# Patient Record
Sex: MALE | Race: WHITE | Employment: OTHER | URBAN - METROPOLITAN AREA
[De-identification: names, ages, dates, MRNs, and addresses within clinical notes are randomized per-mention and may not be internally consistent; named-entity substitution may affect disease eponyms.]

---

## 2017-05-23 NOTE — PERIOP NOTES
H&P received from Dr. Oneida rose is outdated and the EKG is not signed or interpreted by a physician. Left a VM johnie Canseco at Dr. Oneida Carreno office informing her of this. Stated that Dr. Johnathon Tsang would have to do a full H&P the morning of surgery unless a current H&P was obtained. Also informed her that if an EKG was needed by anesthesia, that another EKG would be performed on the day of surgery unless an EKG that was signed and interpreted was able to be obtained.   DOS: 5/25/2017

## 2017-05-24 ENCOUNTER — ANESTHESIA EVENT (OUTPATIENT)
Dept: SURGERY | Age: 52
End: 2017-05-24
Payer: COMMERCIAL

## 2017-05-24 PROBLEM — M93.272: Status: ACTIVE | Noted: 2017-05-24

## 2017-05-25 ENCOUNTER — APPOINTMENT (OUTPATIENT)
Dept: GENERAL RADIOLOGY | Age: 52
End: 2017-05-25
Attending: ORTHOPAEDIC SURGERY
Payer: COMMERCIAL

## 2017-05-25 ENCOUNTER — HOSPITAL ENCOUNTER (OUTPATIENT)
Age: 52
Discharge: HOME | End: 2017-05-26
Attending: ORTHOPAEDIC SURGERY | Admitting: ORTHOPAEDIC SURGERY
Payer: COMMERCIAL

## 2017-05-25 ENCOUNTER — ANESTHESIA (OUTPATIENT)
Dept: SURGERY | Age: 52
End: 2017-05-25
Payer: COMMERCIAL

## 2017-05-25 PROCEDURE — 77030021302 HC BUR RND FLUT BRSM -B: Performed by: ORTHOPAEDIC SURGERY

## 2017-05-25 PROCEDURE — 77030020268 HC MISC GENERAL SUPPLY: Performed by: ORTHOPAEDIC SURGERY

## 2017-05-25 PROCEDURE — 77030002916 HC SUT ETHLN J&J -A: Performed by: ORTHOPAEDIC SURGERY

## 2017-05-25 PROCEDURE — 77030003862 HC BIT DRL SYNT -B: Performed by: ORTHOPAEDIC SURGERY

## 2017-05-25 PROCEDURE — C1713 ANCHOR/SCREW BN/BN,TIS/BN: HCPCS | Performed by: ORTHOPAEDIC SURGERY

## 2017-05-25 PROCEDURE — 77030020782 HC GWN BAIR PAWS FLX 3M -B

## 2017-05-25 PROCEDURE — 99218 HC RM OBSERVATION: CPT

## 2017-05-25 PROCEDURE — 77030012890

## 2017-05-25 PROCEDURE — 77030018836 HC SOL IRR NACL ICUM -A: Performed by: ORTHOPAEDIC SURGERY

## 2017-05-25 PROCEDURE — 74011000250 HC RX REV CODE- 250: Performed by: ORTHOPAEDIC SURGERY

## 2017-05-25 PROCEDURE — 77030027138 HC INCENT SPIROMETER -A

## 2017-05-25 PROCEDURE — 77030028907 HC WRP KNEE WO BGS SOLM -B

## 2017-05-25 PROCEDURE — 76060000065 HC AMB SURG ANES 2.5 TO 3 HR: Performed by: ORTHOPAEDIC SURGERY

## 2017-05-25 PROCEDURE — 76030000022 HC AMB SURG 2.5 TO 3 HR INTENSV-TIER 1: Performed by: ORTHOPAEDIC SURGERY

## 2017-05-25 PROCEDURE — 77030014646 HC SEAL FBRN TISSL 2ML W/APPL BAXT -C: Performed by: ORTHOPAEDIC SURGERY

## 2017-05-25 PROCEDURE — 74011250637 HC RX REV CODE- 250/637: Performed by: PHYSICIAN ASSISTANT

## 2017-05-25 PROCEDURE — 74011000250 HC RX REV CODE- 250

## 2017-05-25 PROCEDURE — 77030032490 HC SLV COMPR SCD KNE COVD -B

## 2017-05-25 PROCEDURE — 77030026202 HC GRFT BN SUB DBX FD LIFV -C: Performed by: ORTHOPAEDIC SURGERY

## 2017-05-25 PROCEDURE — 77030031139 HC SUT VCRL2 J&J -A: Performed by: ORTHOPAEDIC SURGERY

## 2017-05-25 PROCEDURE — 77030028224 HC PDNG CST BSNM -A: Performed by: ORTHOPAEDIC SURGERY

## 2017-05-25 PROCEDURE — 77030011640 HC PAD GRND REM COVD -A: Performed by: ORTHOPAEDIC SURGERY

## 2017-05-25 PROCEDURE — 64445 NJX AA&/STRD SCIATIC NRV IMG: CPT

## 2017-05-25 PROCEDURE — 74011250636 HC RX REV CODE- 250/636: Performed by: PHYSICIAN ASSISTANT

## 2017-05-25 PROCEDURE — 76210000035 HC AMBSU PH I REC 1 TO 1.5 HR: Performed by: ORTHOPAEDIC SURGERY

## 2017-05-25 PROCEDURE — 77030020143 HC AIRWY LARYN INTUB CGAS -A: Performed by: ANESTHESIOLOGY

## 2017-05-25 PROCEDURE — 77030000032 HC CUF TRNQT ZIMM -B: Performed by: ORTHOPAEDIC SURGERY

## 2017-05-25 PROCEDURE — 77030003601 HC NDL NRV BLK BBMI -A

## 2017-05-25 PROCEDURE — 74011250636 HC RX REV CODE- 250/636

## 2017-05-25 PROCEDURE — 74011250636 HC RX REV CODE- 250/636: Performed by: ORTHOPAEDIC SURGERY

## 2017-05-25 PROCEDURE — 77030003861 HC BIT DRL STRY -C: Performed by: ORTHOPAEDIC SURGERY

## 2017-05-25 PROCEDURE — 74011250636 HC RX REV CODE- 250/636: Performed by: ANESTHESIOLOGY

## 2017-05-25 PROCEDURE — C1769 GUIDE WIRE: HCPCS | Performed by: ORTHOPAEDIC SURGERY

## 2017-05-25 PROCEDURE — 77030019908 HC STETH ESOPH SIMS -A: Performed by: ANESTHESIOLOGY

## 2017-05-25 DEVICE — LOCKING SCREW, T10
Type: IMPLANTABLE DEVICE | Site: ANKLE | Status: FUNCTIONAL
Brand: VARIAX

## 2017-05-25 DEVICE — BONE SCREW, T10
Type: IMPLANTABLE DEVICE | Site: ANKLE | Status: FUNCTIONAL
Brand: VARIAX

## 2017-05-25 DEVICE — OPTIUM DBM PUTTY
Type: IMPLANTABLE DEVICE | Site: ANKLE | Status: FUNCTIONAL
Brand: OPTIUM®

## 2017-05-25 DEVICE — STRAIGHT PLATE
Type: IMPLANTABLE DEVICE | Site: ANKLE | Status: FUNCTIONAL
Brand: VARIAX

## 2017-05-25 RX ORDER — LIDOCAINE HYDROCHLORIDE 10 MG/ML
0.1 INJECTION, SOLUTION EPIDURAL; INFILTRATION; INTRACAUDAL; PERINEURAL AS NEEDED
Status: DISCONTINUED | OUTPATIENT
Start: 2017-05-25 | End: 2017-05-25 | Stop reason: HOSPADM

## 2017-05-25 RX ORDER — SODIUM CHLORIDE 0.9 % (FLUSH) 0.9 %
5-10 SYRINGE (ML) INJECTION EVERY 8 HOURS
Status: DISCONTINUED | OUTPATIENT
Start: 2017-05-25 | End: 2017-05-25 | Stop reason: HOSPADM

## 2017-05-25 RX ORDER — HYDROMORPHONE HYDROCHLORIDE 1 MG/ML
.25-1 INJECTION, SOLUTION INTRAMUSCULAR; INTRAVENOUS; SUBCUTANEOUS
Status: DISCONTINUED | OUTPATIENT
Start: 2017-05-25 | End: 2017-05-25 | Stop reason: HOSPADM

## 2017-05-25 RX ORDER — ONDANSETRON 2 MG/ML
4 INJECTION INTRAMUSCULAR; INTRAVENOUS AS NEEDED
Status: DISCONTINUED | OUTPATIENT
Start: 2017-05-25 | End: 2017-05-25 | Stop reason: HOSPADM

## 2017-05-25 RX ORDER — DIPHENHYDRAMINE HYDROCHLORIDE 50 MG/ML
12.5 INJECTION, SOLUTION INTRAMUSCULAR; INTRAVENOUS
Status: DISCONTINUED | OUTPATIENT
Start: 2017-05-25 | End: 2017-05-26 | Stop reason: HOSPADM

## 2017-05-25 RX ORDER — OXYCODONE HYDROCHLORIDE 5 MG/1
10 TABLET ORAL
Status: DISCONTINUED | OUTPATIENT
Start: 2017-05-25 | End: 2017-05-26 | Stop reason: HOSPADM

## 2017-05-25 RX ORDER — MIDAZOLAM HYDROCHLORIDE 1 MG/ML
INJECTION, SOLUTION INTRAMUSCULAR; INTRAVENOUS AS NEEDED
Status: DISCONTINUED | OUTPATIENT
Start: 2017-05-25 | End: 2017-05-25 | Stop reason: HOSPADM

## 2017-05-25 RX ORDER — SODIUM CHLORIDE, SODIUM LACTATE, POTASSIUM CHLORIDE, CALCIUM CHLORIDE 600; 310; 30; 20 MG/100ML; MG/100ML; MG/100ML; MG/100ML
100 INJECTION, SOLUTION INTRAVENOUS CONTINUOUS
Status: DISCONTINUED | OUTPATIENT
Start: 2017-05-25 | End: 2017-05-25 | Stop reason: HOSPADM

## 2017-05-25 RX ORDER — SODIUM CHLORIDE 0.9 % (FLUSH) 0.9 %
5-10 SYRINGE (ML) INJECTION AS NEEDED
Status: DISCONTINUED | OUTPATIENT
Start: 2017-05-25 | End: 2017-05-26 | Stop reason: HOSPADM

## 2017-05-25 RX ORDER — NALOXONE HYDROCHLORIDE 0.4 MG/ML
0.4 INJECTION, SOLUTION INTRAMUSCULAR; INTRAVENOUS; SUBCUTANEOUS AS NEEDED
Status: DISCONTINUED | OUTPATIENT
Start: 2017-05-25 | End: 2017-05-26 | Stop reason: HOSPADM

## 2017-05-25 RX ORDER — OXYCODONE HYDROCHLORIDE 5 MG/1
5 TABLET ORAL
Status: DISCONTINUED | OUTPATIENT
Start: 2017-05-25 | End: 2017-05-26 | Stop reason: HOSPADM

## 2017-05-25 RX ORDER — CEFAZOLIN SODIUM 1 G/3ML
INJECTION, POWDER, FOR SOLUTION INTRAMUSCULAR; INTRAVENOUS AS NEEDED
Status: DISCONTINUED | OUTPATIENT
Start: 2017-05-25 | End: 2017-05-25 | Stop reason: HOSPADM

## 2017-05-25 RX ORDER — SODIUM CHLORIDE 0.9 % (FLUSH) 0.9 %
5-10 SYRINGE (ML) INJECTION AS NEEDED
Status: DISCONTINUED | OUTPATIENT
Start: 2017-05-25 | End: 2017-05-25 | Stop reason: HOSPADM

## 2017-05-25 RX ORDER — CEFAZOLIN SODIUM IN 0.9 % NACL 2 G/50 ML
2 INTRAVENOUS SOLUTION, PIGGYBACK (ML) INTRAVENOUS EVERY 8 HOURS
Status: COMPLETED | OUTPATIENT
Start: 2017-05-25 | End: 2017-05-26

## 2017-05-25 RX ORDER — AMOXICILLIN 250 MG
1 CAPSULE ORAL 2 TIMES DAILY
Status: DISCONTINUED | OUTPATIENT
Start: 2017-05-25 | End: 2017-05-26 | Stop reason: HOSPADM

## 2017-05-25 RX ORDER — HYDROMORPHONE HYDROCHLORIDE 1 MG/ML
0.5 INJECTION, SOLUTION INTRAMUSCULAR; INTRAVENOUS; SUBCUTANEOUS
Status: DISCONTINUED | OUTPATIENT
Start: 2017-05-25 | End: 2017-05-26 | Stop reason: HOSPADM

## 2017-05-25 RX ORDER — SODIUM CHLORIDE 0.9 % (FLUSH) 0.9 %
5-10 SYRINGE (ML) INJECTION EVERY 8 HOURS
Status: DISCONTINUED | OUTPATIENT
Start: 2017-05-26 | End: 2017-05-26 | Stop reason: HOSPADM

## 2017-05-25 RX ORDER — SODIUM CHLORIDE 9 MG/ML
75 INJECTION, SOLUTION INTRAVENOUS CONTINUOUS
Status: DISCONTINUED | OUTPATIENT
Start: 2017-05-25 | End: 2017-05-26 | Stop reason: HOSPADM

## 2017-05-25 RX ORDER — ONDANSETRON 2 MG/ML
INJECTION INTRAMUSCULAR; INTRAVENOUS AS NEEDED
Status: DISCONTINUED | OUTPATIENT
Start: 2017-05-25 | End: 2017-05-25 | Stop reason: HOSPADM

## 2017-05-25 RX ORDER — DIPHENHYDRAMINE HYDROCHLORIDE 50 MG/ML
12.5 INJECTION, SOLUTION INTRAMUSCULAR; INTRAVENOUS AS NEEDED
Status: DISCONTINUED | OUTPATIENT
Start: 2017-05-25 | End: 2017-05-25 | Stop reason: HOSPADM

## 2017-05-25 RX ORDER — SODIUM CHLORIDE, SODIUM LACTATE, POTASSIUM CHLORIDE, CALCIUM CHLORIDE 600; 310; 30; 20 MG/100ML; MG/100ML; MG/100ML; MG/100ML
125 INJECTION, SOLUTION INTRAVENOUS CONTINUOUS
Status: DISCONTINUED | OUTPATIENT
Start: 2017-05-25 | End: 2017-05-25 | Stop reason: HOSPADM

## 2017-05-25 RX ORDER — ROPIVACAINE HYDROCHLORIDE 5 MG/ML
INJECTION, SOLUTION EPIDURAL; INFILTRATION; PERINEURAL AS NEEDED
Status: DISCONTINUED | OUTPATIENT
Start: 2017-05-25 | End: 2017-05-25 | Stop reason: HOSPADM

## 2017-05-25 RX ORDER — OMEPRAZOLE 20 MG/1
20 CAPSULE, DELAYED RELEASE ORAL DAILY
COMMUNITY

## 2017-05-25 RX ORDER — ASPIRIN 325 MG
325 TABLET, DELAYED RELEASE (ENTERIC COATED) ORAL 2 TIMES DAILY
Status: DISCONTINUED | OUTPATIENT
Start: 2017-05-25 | End: 2017-05-26 | Stop reason: HOSPADM

## 2017-05-25 RX ORDER — ONDANSETRON 2 MG/ML
4 INJECTION INTRAMUSCULAR; INTRAVENOUS
Status: DISCONTINUED | OUTPATIENT
Start: 2017-05-25 | End: 2017-05-26 | Stop reason: HOSPADM

## 2017-05-25 RX ORDER — EPINEPHRINE 1 MG/ML
INJECTION INTRAMUSCULAR; INTRAVENOUS; SUBCUTANEOUS AS NEEDED
Status: DISCONTINUED | OUTPATIENT
Start: 2017-05-25 | End: 2017-05-25 | Stop reason: HOSPADM

## 2017-05-25 RX ORDER — FENTANYL CITRATE 50 UG/ML
INJECTION, SOLUTION INTRAMUSCULAR; INTRAVENOUS AS NEEDED
Status: DISCONTINUED | OUTPATIENT
Start: 2017-05-25 | End: 2017-05-25 | Stop reason: HOSPADM

## 2017-05-25 RX ORDER — CEFAZOLIN SODIUM IN 0.9 % NACL 2 G/50 ML
2 INTRAVENOUS SOLUTION, PIGGYBACK (ML) INTRAVENOUS ONCE
Status: DISCONTINUED | OUTPATIENT
Start: 2017-05-25 | End: 2017-05-25 | Stop reason: HOSPADM

## 2017-05-25 RX ORDER — DEXAMETHASONE SODIUM PHOSPHATE 4 MG/ML
INJECTION, SOLUTION INTRA-ARTICULAR; INTRALESIONAL; INTRAMUSCULAR; INTRAVENOUS; SOFT TISSUE AS NEEDED
Status: DISCONTINUED | OUTPATIENT
Start: 2017-05-25 | End: 2017-05-25 | Stop reason: HOSPADM

## 2017-05-25 RX ORDER — PROPOFOL 10 MG/ML
INJECTION, EMULSION INTRAVENOUS AS NEEDED
Status: DISCONTINUED | OUTPATIENT
Start: 2017-05-25 | End: 2017-05-25 | Stop reason: HOSPADM

## 2017-05-25 RX ORDER — PANTOPRAZOLE SODIUM 40 MG/1
40 TABLET, DELAYED RELEASE ORAL
Status: DISCONTINUED | OUTPATIENT
Start: 2017-05-26 | End: 2017-05-26 | Stop reason: HOSPADM

## 2017-05-25 RX ADMIN — SODIUM CHLORIDE 75 ML/HR: 900 INJECTION, SOLUTION INTRAVENOUS at 13:31

## 2017-05-25 RX ADMIN — MIDAZOLAM HYDROCHLORIDE 1 MG: 1 INJECTION, SOLUTION INTRAMUSCULAR; INTRAVENOUS at 08:20

## 2017-05-25 RX ADMIN — DEXAMETHASONE SODIUM PHOSPHATE 4 MG: 4 INJECTION, SOLUTION INTRA-ARTICULAR; INTRALESIONAL; INTRAMUSCULAR; INTRAVENOUS; SOFT TISSUE at 09:00

## 2017-05-25 RX ADMIN — FENTANYL CITRATE 50 MCG: 50 INJECTION, SOLUTION INTRAMUSCULAR; INTRAVENOUS at 08:15

## 2017-05-25 RX ADMIN — OXYCODONE HYDROCHLORIDE 10 MG: 5 TABLET ORAL at 22:25

## 2017-05-25 RX ADMIN — PROPOFOL 150 MG: 10 INJECTION, EMULSION INTRAVENOUS at 08:37

## 2017-05-25 RX ADMIN — OXYCODONE HYDROCHLORIDE 10 MG: 5 TABLET ORAL at 18:57

## 2017-05-25 RX ADMIN — SODIUM CHLORIDE, POTASSIUM CHLORIDE, SODIUM LACTATE AND CALCIUM CHLORIDE: 600; 310; 30; 20 INJECTION, SOLUTION INTRAVENOUS at 07:33

## 2017-05-25 RX ADMIN — FENTANYL CITRATE 50 MCG: 50 INJECTION, SOLUTION INTRAMUSCULAR; INTRAVENOUS at 08:20

## 2017-05-25 RX ADMIN — ASPIRIN 325 MG: 325 TABLET, DELAYED RELEASE ORAL at 18:13

## 2017-05-25 RX ADMIN — SODIUM CHLORIDE, POTASSIUM CHLORIDE, SODIUM LACTATE AND CALCIUM CHLORIDE: 600; 310; 30; 20 INJECTION, SOLUTION INTRAVENOUS at 11:01

## 2017-05-25 RX ADMIN — MIDAZOLAM HYDROCHLORIDE 1 MG: 1 INJECTION, SOLUTION INTRAMUSCULAR; INTRAVENOUS at 08:16

## 2017-05-25 RX ADMIN — ROPIVACAINE HYDROCHLORIDE 10 ML: 5 INJECTION, SOLUTION EPIDURAL; INFILTRATION; PERINEURAL at 08:27

## 2017-05-25 RX ADMIN — OXYCODONE HYDROCHLORIDE 10 MG: 5 TABLET ORAL at 15:46

## 2017-05-25 RX ADMIN — DOCUSATE SODIUM -SENNOSIDES 1 TABLET: 50; 8.6 TABLET, COATED ORAL at 20:17

## 2017-05-25 RX ADMIN — MIDAZOLAM HYDROCHLORIDE 1 MG: 1 INJECTION, SOLUTION INTRAMUSCULAR; INTRAVENOUS at 08:17

## 2017-05-25 RX ADMIN — DOCUSATE SODIUM -SENNOSIDES 1 TABLET: 50; 8.6 TABLET, COATED ORAL at 15:46

## 2017-05-25 RX ADMIN — CEFAZOLIN 2 G: 1 INJECTION, POWDER, FOR SOLUTION INTRAMUSCULAR; INTRAVENOUS; PARENTERAL at 15:46

## 2017-05-25 RX ADMIN — MIDAZOLAM HYDROCHLORIDE 2 MG: 1 INJECTION, SOLUTION INTRAMUSCULAR; INTRAVENOUS at 08:15

## 2017-05-25 RX ADMIN — ONDANSETRON 4 MG: 2 INJECTION INTRAMUSCULAR; INTRAVENOUS at 10:33

## 2017-05-25 RX ADMIN — CEFAZOLIN 2 G: 1 INJECTION, POWDER, FOR SOLUTION INTRAMUSCULAR; INTRAVENOUS; PARENTERAL at 22:25

## 2017-05-25 RX ADMIN — FENTANYL CITRATE 50 MCG: 50 INJECTION, SOLUTION INTRAMUSCULAR; INTRAVENOUS at 10:55

## 2017-05-25 RX ADMIN — CEFAZOLIN SODIUM 1 G: 1 INJECTION, POWDER, FOR SOLUTION INTRAMUSCULAR; INTRAVENOUS at 08:37

## 2017-05-25 RX ADMIN — ROPIVACAINE HYDROCHLORIDE 30 ML: 5 INJECTION, SOLUTION EPIDURAL; INFILTRATION; PERINEURAL at 08:22

## 2017-05-25 NOTE — BRIEF OP NOTE
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 OPEN VERNON OF ACI LESION OF TALAR DOME,  2. DRILLING/CURETTAGE OF SUBCHONDRAL CYST  3. LEFT ANKLE OPEN TENOSYNOVECTOMY OF PERONEAL TENDON WITH MUSCLE DEBULKING,     Surgeon(s) and Role:     * Michelle Adams MD - Primary     * Janie Nava. Jj Shearer MD - Assisting         Assistant Staff:  Physician Assistant: Ish Jaramillo PA-C    Surgical Staff:  Circ-1: Amauri Maravilla RN  Physician Assistant: Ish Jaramillo PA-C  Scrub Tech-1: Adrian Ernandez  Event Time In   Incision Start 4433   Incision Close      Anesthesia: General   Estimated Blood Loss:less than 20 cc  Specimens: * No specimens in log *   Findings: 1.large chondral defect of postero-medial talar dome / good solid sub chondral plate with cyst beneath 2 Flat peroneal brevis tendon with tenosynovitis and low lying muscle belly  Complications: none  Implants:   Implant Name Type Inv.  Item Serial No.  Lot No. LRB No. Used Action   [employer] CULTURED CHONDROCYTES ON PORCINE COLLAGEN   [id number]  [id number] Left 1 Implanted   GRAFT BNE SUB PUTTY FD 1ML -- OPTIUM - [id number]  GRAFT BNE SUB PUTTY FD 1ML -- Sae Bass [id number] [facility] TISSUE BANK  Left 1 Implanted   PLATE STR 5H NS -- VARIAX FIBULA - SN/A  PLATE STR 5H NS -- VARIAX FIBULA N/A [employer] HOWM N/A Left 1 Implanted   SCR BNE NLCK T10 3.5X30MM TI -- VARIAX FT - SN/A  SCR BNE NLCK T10 3.5X30MM TI -- VARIAX FT N/A [employer] HOWM N/A Left 1 Implanted   REUBEN 3.5 X 32 NON LOCKING SCREW   N/A  N/A Left 1 Implanted   REUBEN 3.5 X 38MM NON LOCKING SCREW   N/A  N/A Left 1 Implanted   SCR BNE LCK T10 3.5X12MM TI -- VARIAX FT - SN/A   SCR BNE LCK T10 3.5X12MM TI -- VARIAX FT N/A [employer] HOWM N/A Left 1 Implanted

## 2017-05-25 NOTE — ANESTHESIA PREPROCEDURE EVALUATION
Anesthetic History   No history of anesthetic complications            Review of Systems / Medical History  Patient summary reviewed, nursing notes reviewed and pertinent labs reviewed    Pulmonary          Smoker         Neuro/Psych   Within defined limits           Cardiovascular  Within defined limits                Exercise tolerance: >4 METS     GI/Hepatic/Renal     GERD: well controlled      PUD     Endo/Other  Within defined limits           Other Findings              Physical Exam    Airway  Mallampati: II  TM Distance: 4 - 6 cm  Neck ROM: normal range of motion   Mouth opening: Normal     Cardiovascular    Rhythm: regular  Rate: normal         Dental    Dentition: Lower partial plate     Pulmonary  Breath sounds clear to auscultation               Abdominal         Other Findings            Anesthetic Plan    ASA: 2  Anesthesia type: general          Induction: Intravenous  Anesthetic plan and risks discussed with: Patient

## 2017-05-25 NOTE — PROGRESS NOTES
5/25/2017 1:38 PM Met with pt. Obs letter given and explained. Charted address and phone numbers confirmed. Pt will be staying in [address] one night after discharge and then going to stay with his girlfriend, Dale Carlos in [facility]. At pt's girlfriend's home in [facility] there are 3 steps to enter. Pt has no history of home health. Pt owns crutches and a knee scooter. Pt has rx coverage and fills his scripts at [facility]. No discharge needs identified. CM will follow. PARVIZ Cronin  Care Management Interventions  PCP Verified by CM: Yes (Viv Salinas )  Mode of Transport at Discharge:  Other (see comment) (Pt's girlfriend )  Discharge Durable Medical Equipment: No (Pt owns crutches and a knee scooter)  Physical Therapy Consult: Yes  Current Support Network: Relative's Home, Other  Confirm Follow Up Transport: Friends  Discharge Location  Discharge Placement: Home with outpatient services

## 2017-05-25 NOTE — PERIOP NOTES
MITCHELL FIBRIN SEALANT TISSEEL 4ML   LOT # [id number]  EXPIRATION DATE: SEPT. 30, 2018  SERIAL # [id number]

## 2017-05-25 NOTE — PROGRESS NOTES
4th floor notified    FABIAN DELACRUZ of admission and to anticipate arrival in   20   minutes to room 406.

## 2017-05-25 NOTE — IP AVS SNAPSHOT
Sol Mancia 
 
 
 [address] [facility] 
[phone] Patient: Idania Clifton MRN: [id number] QGL:9/67/4057 You are allergic to the following No active allergies Recent Documentation Height Weight BMI Smoking Status 1.715 m 67.8 kg 23.07 kg/m2 Current Every Day Smoker Emergency Contacts Name Discharge Info Relation Home Work Mobile Ashlyn Arvizu DISCHARGE CAREGIVER [3] Girlfriend [18]   [phone] About your hospitalization You were admitted on:  May 25, 2017 You last received care in the:  [facility] 4M POST SURG ORT 1 You were discharged on:  May 26, 2017 Unit phone number:  [phone] Why you were hospitalized Your primary diagnosis was:  Not on File Your diagnoses also included:  Osteochondritis Dissecans, Left Ankle And Joints Of Left Foot Providers Seen During Your Hospitalizations Provider Role Specialty Primary office phone Juan Chatman MD Attending Provider Orthopedic Surgery [phone] Your Primary Care Physician (PCP) Primary Care Physician Office Phone Office Fax Reese Sheppard [phone] Follow-up Information Follow up With Details Comments Contact Info Ashutosh Murphy MD   [address] [facility] [address] [place] [address] 
[phone] Current Discharge Medication List  
  
START taking these medications Dose & Instructions Dispensing Information Comments Morning Noon Evening Bedtime  
 ondansetron 4 mg disintegrating tablet Commonly known as:  ZOFRAN ODT Your last dose was: Your next dose is:    
   
   
 Dose:  4 mg Take 1 Tab by mouth every eight (8) hours as needed for Nausea. Quantity:  30 Tab Refills:  0  
     
   
   
   
  
 oxyCODONE IR 5 mg immediate release tablet Commonly known as:  Loc Villar Your last dose was: Your next dose is: One to two tablets by mouth every 3 hours as needed for pain Quantity:  90 Tab Refills:  0 CONTINUE these medications which have NOT CHANGED Dose & Instructions Dispensing Information Comments Morning Noon Evening Bedtime PriLOSEC 20 mg capsule Generic drug:  omeprazole Your last dose was: Your next dose is:    
   
   
 Dose:  20 mg Take 20 mg by mouth daily. Refills:  0 PROBIOTIC 4X 10-15 mg Tbec Generic drug:  B.infantis-B.ani-B.long-B.bifi Your last dose was: Your next dose is:    
   
   
 Dose:  1 Tab Take 1 Tab by mouth daily. Refills:  0 Where to Get Your Medications Information on where to get these meds will be given to you by the nurse or doctor. ! Ask your nurse or doctor about these medications  
  ondansetron 4 mg disintegrating tablet  
 oxyCODONE IR 5 mg immediate release tablet Discharge Instructions DR. Sonia Leyden / DR. GIULIA LIANG 
 
POST OPERATIVE INSTRUCTIONS FOLLOWING OPEN ANKLE SURGERY In order to continue your care at home, please follow these guidelines: **Please do not drive, operate any machinery, or make important business decisions for twenty four hours due to the medications you have received. 1. First meal at home should be clear liquids and return to regular diet as tolerated. 2. Elevate the surgical leg when sitting and sleeping to reduce swelling. DO NOT place a pillow directly under the knee, place it under your lower leg. 3. Continue to wear your splint until your first post-op visit. Keep it clean and dry at all times, including when you shower. 4. Use 2 crutches at all times. Patient is non weightbearing with crutches. 5. Let pain be your guide to activity; too much pain, too much activity. 6. A prescription for pain medication has been provided.   No anti-inflammatories. 7. You have been given a follow up appointment date. If this is not feasible or you have forgotten, please call the office the day after surgery to obtain an appointment. 8. If you develop a fever greater than 101 degrees, unexpected redness or swelling in your lower extremity, please call the office immediately. Discharge Orders None [facility] [employer] Announcement We are excited to announce that we are making your provider's discharge notes available to you in Mob[employer]. You will see these notes when they are completed and signed by the physician that discharged you from your recent hospital stay. If you have any questions or concerns about any information you see in Mobshop, please call the Health Information Department where you were seen or reach out to your Primary Care Provider for more information about your plan of care. Introducing [facility]! Myra Brantley introduces Mobshop patient portal. Now you can access parts of your medical record, email your doctor's office, and request medication refills online. 1. In your internet browser, go to [email or web address] 2. Click on the First Time User? Click Here link in the Sign In box. You will see the New Member Sign Up page. 3. Enter your Mobshop Access Code exactly as it appears below. You will not need to use this code after youve completed the sign-up process. If you do not sign up before the expiration date, you must request a new code. · Mobshop Access Code: [address] Expires: 8/23/2017  6:39 AM 
 
4. Enter the last four digits of your Social Security Number (xxxx) and Date of Birth (mm/dd/yyyy) as indicated and click Submit. You will be taken to the next sign-up page. 5. Create a [employer] ID. This will be your Mobshop login ID and cannot be changed, so think of one that is secure and easy to remember. 6. Create a Mobshop password. You can change your password at any time. 7. Enter your Password Reset Question and Answer. This can be used at a later time if you forget your password. 8. Enter your e-mail address. You will receive e-mail notification when new information is available in [address] 9. Click Sign Up. You can now view and download portions of your medical record. 10. Click the Download Summary menu link to download a portable copy of your medical information. If you have questions, please visit the Frequently Asked Questions section of the [employer] website. Remember, [employer] is NOT to be used for urgent needs. For medical emergencies, dial 911. Now available from your iPhone and Android! General Information Please provide this summary of care documentation to your next provider. Patient Signature:  ____________________________________________________________ Date:  ____________________________________________________________  
  
Aloma Snellen Provider Signature:  ____________________________________________________________ Date:  ____________________________________________________________

## 2017-05-25 NOTE — PERIOP NOTES
MITCHELL FIBRIN SEALANT TISSEEL 4ML USED INTRA OP PER DR Fito Machado TO LEFT ANKLE  LOT# [id number]  EXP: 09/30/2018  SERIAL # [id number]

## 2017-05-25 NOTE — ANESTHESIA PROCEDURE NOTES
Peripheral Block    Start time: 5/25/2017 8:15 AM  End time: 5/25/2017 8:27 AM  Performed by: Geovanny Muhammad  Authorized by: Geovanny Muhammad       Pre-procedure: Indications: at surgeon's request and post-op pain management    Preanesthetic Checklist: patient identified, risks and benefits discussed, site marked, timeout performed, anesthesia consent given and patient being monitored    Timeout Time: 08:15          Block Type:   Block Type:  Popliteal and saphenous  Laterality:  Left  Monitoring:  Continuous pulse ox, frequent vital sign checks, heart rate and responsive to questions  Injection Technique:  Single shot  Procedures: ultrasound guided and nerve stimulator    Patient Position: supine  Prep: chlorhexidine    Location:  Lower thigh  Needle Type:  Stimuplex  Needle Gauge:  21 G  Needle Localization:  Nerve stimulator and ultrasound guidance  Medication Injected:  0.5%  ropivacaine  Volume (mL):  30    Assessment:  Number of attempts:  1  Injection Assessment:  Incremental injection every 5 mL, local visualized surrounding nerve on ultrasound, negative aspiration for blood, no paresthesia and no intravascular symptoms  Patient tolerance:  Patient tolerated the procedure well with no immediate complications  Saphenous block done under ultrasound guidance with incremental injection of Ropivacaine 0.5% 10 cc using a 21 G Stimuplex needle.

## 2017-05-25 NOTE — PERIOP NOTES
PACU IN REPORT FROM ANESTHESIA    Verbal report received from   [address] [place] [address]  following General for Procedure(s) (LRB):  LEFT ANKLE OPEN TENOSYNOVECTOMY OF PERONEAL TENDON WITH MUSCLE DEBULKING, OPEN VERNON OF ACI LESION OF TALAR DOME, DRILLING/CURETTAGE OF SUBCHONDRAL CYST (Left) by  Sandra Dubon MD.    Note the anesthesia record for medications given intraoperatively. Brief Initial Visual Assessment:    Airway is:   Patent   Respiratory pattern is:    Even, Non-labored. Patient is:     alert and oriented x 1 (Person.)     Skin is:   Pink, Warm and Dry. Membranes are:    Pink and Moist.    Patient is in:    No Acute Discomfort. 0 /10 pain using   A.N.V.  Scale. - Note E-MAR for medications administered. Note assessments documented in flowsheets;any assessment variants to be found in comments or narrative perioperative nurse notes.        Post-anesthesia care now assumed, record signed by Briseida JONES, RN-BC

## 2017-05-25 NOTE — OP NOTES
[facility]   [address] [facility], [address]   OP NOTE       Name:  Lamin Piña   MR#:  [id number]   :  1965   Account #:  [de-identified]    Surgery Date:  2017   Date of Adm:  2017             PREOPERATIVE DIAGNOSES    1. Left ankle osteochondral defect and large subchondral cyst of the   medial talus in a professional dancer. 2. Peroneal tenosynovitis, possible split tendon or low-lying muscle   belly of peroneus brevis. POSTOPERATIVE DIAGNOSES    1. Chondral defect, 1 x 1 cm on the medial talar dome with intact   subchondral plate, but deep subchondral cyst.   2. Peroneal tenosynovitis with low-lying muscle belly of peroneus   brevis. PROCEDURES PERFORMED    1. Open VERNON (cartilage transplant) of talar dome. 2. Drilling, curettage and grafting of subchondral cyst.   3. Left ankle open tenosynovectomy of peroneal tendon with muscle   debulking. SURGEON: Cristi Francis MD     ASSISTANT: Valarie Barlow. 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 He has been treated with injections and   other treatments to date. An MRI and CAT scan ultimately showed a   very large deep subchondral cyst and chondral defect. After a   significant amount of thought processes and second opinions between   myself and Dr. Cliff Lovell, we decided to proceed with curettage and   drilling of the cyst, bone grafting if needed, followed by matrix articular   cartilage transplant of the defect for this dancer.     In addition, the patient had been complaining prior to this of lateral   peroneal tendon pain and the MRI showed a split peroneus brevis   tendon. We would address this at the same time. DESCRIPTION OF PROCEDURE: The patient was placed on the   table in supine position and prepped and draped in the usual sterile   manner. Tourniquet was inflated and a lateral incision was made along   the fibula. The peroneal sheath was incised carefully, taking care not to   injure any nerves or vascular structure. The patient's peroneus brevis   and longus were fully evaluated, pulled out of the tunnel and checked,   full length. There was clearly no tear or split of the tendon, but the   peroneus brevis was extremely flattened and had a lot of tenosynovitis   around the tendon and there was significant extra bulk of muscle of the   peroneus brevis, causing this. Therefore, we proceeded to perform   debulking of the muscle of the peroneus brevis and freed up the   tendon. We removed any synovitis and then closed the sheath   carefully and the skin in layers with a subcuticular closure. An anterior incision was then made over the ankle to approach the anterior ankle   and try to do the chondral defect treatment through an anterior   approach. Also, we felt we would take a bone graft from the distal third   of the tibia, if needed, from that area. We could see immediately that   there was a chondral defect with a movable flap of cartilage on the medial   surface of the talar dome, but it was quite posterior. We could get to   the anterior portion and clean off the cartilage, but the most posterior   aspect was not reachable through this, nor was the sub-chondral cyst.   . It was too tight in the ankle and   we felt we had to have access to the cyst from the medial surface. Therefore, a medial incision was made and a typical medial malleolar   osteotomy was performed.  First, a plate was measured and bent to   appropriate shape for the ultimate reattachment of this osteotomy and   a single drill hole was made to ashley our spot once we checked under   fluoroscopic control. We were happy with this position. The osteotomy   was then performed with the power oscillating saw followed by osteotome and once   we completed the cut we loosened the deltoid ligament and moved the osteotomy,We were then   able to get a very good view of the defect. Utilizing appropriate VERNON technique, we prepared a template of the   defect and then on the back table, the surgeon cut the matrix articular   cartilage implant to the appropriate size. Fibrin glue was placed on the   lesion and then the defect had the VERNON applied. Having affixed the   graft, we let it sit for 3 minutes with direct digital pressure to get good   attachment to the subchondral bone. Interestingly, the cyst did not   come up to the subchondral plate. However, we could easily find the   soft bone while drilling from the medial side into the cystic area, which   was visible on the MRI and CAT scan. After multiple drill holes we also   performed some burring and curettage of that lesion and then placed   DBX putty into the very small defect. The osteotomy was closed and   repair of the deltoid ligament was performed. The wounds were then   closed with subcuticular suture and the patient will be placed in a   plaster half cast with the foot in normal neutral dorsiflexion for the first   week. At 1 week, we will  remove him from this and begin range of   motion of the ankle and nonweightbearing for 6 weeks. The wounds were closed. The patient was dressed sterilely, went to   the recovery room in good condition with no complications.         MD DANAE Jackson / FREDY   D:  05/25/2017   11:25   T:  05/25/2017   12:03   Job #:  [id number]

## 2017-05-25 NOTE — PROGRESS NOTES
POST ANESTHESIA CARE   DISCHARGE or TRANSFER NOTE    Martell Yen was   transfered      via     Bed To    hospital room 406  . Patient was escorted by   nurse . Patient verbalized   appreciation and was very pleased with care received   throughout their stay. Patient was discharged in   pleasant mood   . Pain at discharge/transfer was    0 /10. All personal belongings have been returned to patient, and patient/family verbally confirm receiving belongings as all present. TRANSFER - OUT REPORT:    Verbal report given to   Leah Muir RN  On   Martell Yen   being transferred to    [address] [place] [address]  for routine post - op       Report consisted of patients Situation, Background, Assessment and   Recommendations(SBAR). Information from the following report(s) SBAR, OR Summary and MAR was reviewed with the receiving nurse. There are currently no Active Isolations   Lines:   Peripheral IV 05/25/17 Left Wrist (Active)   Site Assessment Clean, dry, & intact 5/25/2017 11:20 AM   Phlebitis Assessment 0 5/25/2017 11:20 AM   Infiltration Assessment 0 5/25/2017 11:20 AM   Dressing Status Clean, dry, & intact 5/25/2017 11:20 AM   Dressing Type Transparent;Tape 5/25/2017 11:20 AM   Hub Color/Line Status Patent; Infusing 5/25/2017 11:20 AM   Alcohol Cap Used Yes 5/25/2017  8:59 AM       Patient transported with:       Registered Nurse    Opportunity for questions and clarification was provided.       Tiffanie JONES RN-BC

## 2017-05-25 NOTE — ANESTHESIA POSTPROCEDURE EVALUATION
Post-Anesthesia Evaluation and Assessment    Patient: Alban Moise MRN: [id number]  SSN: [id number]    YOB: 1965  Age: 46 y.o. Sex: male       Cardiovascular Function/Vital Signs  Visit Vitals    /58    Pulse 91    Temp 36.9 °C (98.4 °F)    Resp 19    Ht 5' 7.5\" (1.715 m)    Wt 67.8 kg (149 lb 7.6 oz)    SpO2 95%    BMI 23.07 kg/m2       Patient is status post general anesthesia for Procedure(s):  LEFT ANKLE OPEN TENOSYNOVECTOMY OF PERONEAL TENDON WITH MUSCLE DEBULKING, OPEN VERNON OF ACI LESION OF TALAR DOME, DRILLING/CURETTAGE OF SUBCHONDRAL CYST. Nausea/Vomiting: None    Postoperative hydration reviewed and adequate. Pain:  Pain Scale 1: Numeric (0 - 10) (05/25/17 1116)  Pain Intensity 1: 0 (05/25/17 1116)   Managed    Neurological Status:   Neuro (WDL): Exceptions to WDL (05/25/17 1116)  Neuro  Neurologic State: Lethargic; Anesthetized (05/25/17 1116)   At baseline    Mental Status and Level of Consciousness: Arousable    Pulmonary Status:   O2 Device: CO2 nasal cannula (05/25/17 1124)   Adequate oxygenation and airway patent    Complications related to anesthesia: None    Post-anesthesia assessment completed.  No concerns    Signed By: Arya Landon MD     May 25, 2017

## 2017-05-26 VITALS
OXYGEN SATURATION: 97 % | HEART RATE: 72 BPM | BODY MASS INDEX: 22.65 KG/M2 | TEMPERATURE: 97.8 F | DIASTOLIC BLOOD PRESSURE: 93 MMHG | WEIGHT: 149.47 LBS | HEIGHT: 68 IN | SYSTOLIC BLOOD PRESSURE: 153 MMHG | RESPIRATION RATE: 18 BRPM

## 2017-05-26 LAB
BASOPHILS # BLD AUTO: 0 K/UL (ref 0–0.1)
BASOPHILS # BLD: 0 % (ref 0–1)
EOSINOPHIL # BLD: 0 K/UL (ref 0–0.4)
EOSINOPHIL NFR BLD: 0 % (ref 0–7)
ERYTHROCYTE [DISTWIDTH] IN BLOOD BY AUTOMATED COUNT: 13.1 % (ref 11.5–14.5)
HCT VFR BLD AUTO: 44.4 % (ref 36.6–50.3)
HGB BLD-MCNC: 15.9 G/DL (ref 12.1–17)
LYMPHOCYTES # BLD AUTO: 10 % (ref 12–49)
LYMPHOCYTES # BLD: 1.5 K/UL (ref 0.8–3.5)
MCH RBC QN AUTO: 32.1 PG (ref 26–34)
MCHC RBC AUTO-ENTMCNC: 35.8 G/DL (ref 30–36.5)
MCV RBC AUTO: 89.7 FL (ref 80–99)
MONOCYTES # BLD: 1.6 K/UL (ref 0–1)
MONOCYTES NFR BLD AUTO: 11 % (ref 5–13)
NEUTS SEG # BLD: 11.8 K/UL (ref 1.8–8)
NEUTS SEG NFR BLD AUTO: 79 % (ref 32–75)
PLATELET # BLD AUTO: 157 K/UL (ref 150–400)
RBC # BLD AUTO: 4.95 M/UL (ref 4.1–5.7)
WBC # BLD AUTO: 14.9 K/UL (ref 4.1–11.1)

## 2017-05-26 PROCEDURE — 36415 COLL VENOUS BLD VENIPUNCTURE: CPT | Performed by: PHYSICIAN ASSISTANT

## 2017-05-26 PROCEDURE — 74011250636 HC RX REV CODE- 250/636: Performed by: PHYSICIAN ASSISTANT

## 2017-05-26 PROCEDURE — 85025 COMPLETE CBC W/AUTO DIFF WBC: CPT | Performed by: PHYSICIAN ASSISTANT

## 2017-05-26 PROCEDURE — 74011250637 HC RX REV CODE- 250/637: Performed by: PHYSICIAN ASSISTANT

## 2017-05-26 RX ORDER — ONDANSETRON 4 MG/1
4 TABLET, ORALLY DISINTEGRATING ORAL
Qty: 30 TAB | Refills: 0 | Status: SHIPPED | OUTPATIENT
Start: 2017-05-26

## 2017-05-26 RX ORDER — OXYCODONE HYDROCHLORIDE 5 MG/1
TABLET ORAL
Qty: 90 TAB | Refills: 0 | Status: SHIPPED | OUTPATIENT
Start: 2017-05-26

## 2017-05-26 RX ADMIN — SODIUM CHLORIDE 75 ML/HR: 900 INJECTION, SOLUTION INTRAVENOUS at 01:49

## 2017-05-26 RX ADMIN — DIPHENHYDRAMINE HYDROCHLORIDE 12.5 MG: 50 INJECTION, SOLUTION INTRAMUSCULAR; INTRAVENOUS at 00:47

## 2017-05-26 RX ADMIN — CEFAZOLIN 2 G: 1 INJECTION, POWDER, FOR SOLUTION INTRAMUSCULAR; INTRAVENOUS; PARENTERAL at 07:00

## 2017-05-26 RX ADMIN — DOCUSATE SODIUM -SENNOSIDES 1 TABLET: 50; 8.6 TABLET, COATED ORAL at 08:33

## 2017-05-26 RX ADMIN — PANTOPRAZOLE SODIUM 40 MG: 40 TABLET, DELAYED RELEASE ORAL at 06:24

## 2017-05-26 RX ADMIN — OXYCODONE HYDROCHLORIDE 10 MG: 5 TABLET ORAL at 09:51

## 2017-05-26 RX ADMIN — ASPIRIN 325 MG: 325 TABLET, DELAYED RELEASE ORAL at 08:33

## 2017-05-26 RX ADMIN — HYDROMORPHONE HYDROCHLORIDE 0.5 MG: 1 INJECTION, SOLUTION INTRAMUSCULAR; INTRAVENOUS; SUBCUTANEOUS at 08:25

## 2017-05-26 RX ADMIN — Medication 1 CAPSULE: at 08:33

## 2017-05-26 RX ADMIN — Medication 10 ML: at 06:25

## 2017-05-26 RX ADMIN — OXYCODONE HYDROCHLORIDE 10 MG: 5 TABLET ORAL at 03:29

## 2017-05-26 RX ADMIN — HYDROMORPHONE HYDROCHLORIDE 0.5 MG: 1 INJECTION, SOLUTION INTRAMUSCULAR; INTRAVENOUS; SUBCUTANEOUS at 00:32

## 2017-05-26 RX ADMIN — OXYCODONE HYDROCHLORIDE 10 MG: 5 TABLET ORAL at 06:24

## 2017-05-26 NOTE — DISCHARGE SUMMARY
Orthopedic Service Discharge Summary    Patient ID:  Ari Burk  [id number]  male  46 y.o.  1965    Admit date: 5/25/2017    Discharge date and time: No discharge date for patient encounter. 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 PMH:   Past Medical History:   Diagnosis Date    Perforated, severe stomach ulcer ([facility] [address] 2012    on prilosec       Medications upon admission :   Prior to Admission Medications   Prescriptions Last Dose Informant Patient Reported? Taking? B.infantis-B.ani-B.long-B.bifi (PROBIOTIC 4X) 10-15 mg TbEC 5/24/2017 at 1700  Yes Yes   Sig: Take 1 Tab by mouth daily. omeprazole (PRILOSEC) 20 mg capsule 5/25/2017 at 0530  Yes Yes   Sig: Take 20 mg by mouth daily. Facility-Administered Medications: None        Allergies:  No Known Allergies     Hospital Course:   The patient underwent surgery. Complications:  None; patient tolerated the procedure well. Was taken to the PACU in stable condition and then transferred to the Orthopedics floor. Perioperative Antibiotics:  Ancef     Postoperative Pain Management:  Oxycodone    DVT Prophylaxis: ASA , SCDs, compression stocking    Postoperative transfusions:     none Banked PRBCs     Post Op complications: none    Hemoglobin at discharge:    Lab Results   Component Value Date/Time    HGB 15.9 05/26/2017 12:37 AM       Splint LLE c/d/i    Discharged to: Home. Discharge instructions:  -See Full Summary of discharge instructions attached  -Anticoagulate with ASA  -Resume pre hospital diet            -Resume home medications   Current Discharge Medication List      START taking these medications    Details   oxyCODONE IR (ROXICODONE) 5 mg immediate release tablet One to two tablets by mouth every 3 hours as needed for pain  Qty: 90 Tab, Refills: 0      ondansetron (ZOFRAN ODT) 4 mg disintegrating tablet Take 1 Tab by mouth every eight (8) hours as needed for Nausea. Qty: 30 Tab, Refills: 0         CONTINUE these medications which have NOT CHANGED    Details   omeprazole (PRILOSEC) 20 mg capsule Take 20 mg by mouth daily. B.infantis-B.ani-B.long-B.bifi (PROBIOTIC 4X) 10-15 mg TbEC Take 1 Tab by mouth daily.           per medical continuation form  -Discharge activity: as tolerated  -Ambulate with crutches NWB 2 crutches  -Wound Care Keep wound clean and dry, Reinforce dressing PRN, Ice to area for comfort and As directed  -Follow up in office 5/31/17 11am [facility] [address]       Signed:  Taisha Bautista PA-C  5/26/2017  7:38 AM        Tianna Watkins MD

## 2017-05-26 NOTE — PROGRESS NOTES
Orthopaedics Daily Progress Note    Date of Surgery:  2017      Patient: Whit Funk   YOB: 1965  Age: 46 y.o. SUBJECTIVE:   1 Day Post-Op following 1. Open VERNON of talar dome, 2. Drilling, curettage and grafting of subchondral cyst, 3. Left ankle open tenosynovectomy of peroneal tendon with muscle debulking. The patient's post operative pain is controlled. OBJECTIVE:     Vital Signs:    Temp (24hrs), Av.1 °F (36.7 °C), Min:97.5 °F (36.4 °C), Max:98.6 °F (37 °C)    Patient Vitals for the past 8 hrs:   BP Temp Pulse Resp SpO2   17 0022 110/71 97.6 °F (36.4 °C) 89 18 95 %           Physical Exam:  General: A&Ox3. The patient is cooperative, and in no acute distress. Respiratory: Respirations are unlabored. LLE: splint c/d/i, toes wiggle, SILT in toes    Laboratory Values:             Recent Labs      17   0037   HGB  15.9   PLT  157     No results found for: NA, K, CL, CO2, GLU, BUN, CREA, CA    PLAN:     S/P LEFT ANKLE OPEN TENOSYNOVECTOMY OF PERONEAL TENDON WITH MUSCLE DEBULKING, OPEN VERNON OF ACI LESION OF TALAR DOME, DRILLING/CURETTAGE OF SUBCHONDRAL CYST - NWB with 2 crutches      Hemodynamics Hgb today is 15.9. Acute blood loss anemia as expected. Patient asymptomatic. Continue to monitor. Wound Keep splint intact, clean, and dry until follow up visit     Post Operative Pain Pain Control: stable, mild-to-moderate joint symptoms intermittently, reasonably well controlled by current meds. DVT Prophylaxis Continue with SCD'S, Compression Stockings, Ankle Pump Exercises. ASA 325mg daily     Discharge Disposition Discharge plan: Home.        Signed By: ANN Daniel Mt  May 26, 2017 7:32 AM

## 2017-05-26 NOTE — PROGRESS NOTES
Bedside and Verbal shift change report given to Bashir Naidu RN (oncoming nurse) by Kt Washington RN (offgoing nurse). Report included the following information SBAR, Kardex, Procedure Summary, Intake/Output, MAR, Accordion and Recent Results.

## 2017-05-26 NOTE — PROGRESS NOTES
Primary Nurse Doyle Carter RN and Mino Edmond RN performed a dual skin assessment on this patient. No impairment noted except incisions beneath dressing to (L) lower leg.   Bandar score is 20

## 2017-05-26 NOTE — PROGRESS NOTES
Discussed discharge instructions with patient and his friend. They verbalized understanding. Copy given. Two prescriptions given. MORRIS  DC'd. Discharged via wheelchair.

## 2017-05-26 NOTE — DISCHARGE INSTRUCTIONS
DR. Colton Goldberg / DR. GIULIA LIANG    POST OPERATIVE INSTRUCTIONS FOLLOWING OPEN ANKLE SURGERY    In order to continue your care at home, please follow these guidelines:   **Please do not drive, operate any machinery, or make important business decisions for twenty four hours due to the medications you have received. 1. First meal at home should be clear liquids and return to regular diet as tolerated. 2. Elevate the surgical leg when sitting and sleeping to reduce swelling. DO NOT place a pillow directly under the knee, place it under your lower leg. 3. Continue to wear your splint until your first post-op visit. Keep it clean and dry at all times, including when you shower. 4. Use 2 crutches at all times. Patient is non weightbearing with crutches. 5. Let pain be your guide to activity; too much pain, too much activity. 6. A prescription for pain medication has been provided. No anti-inflammatories. 7. You have been given a follow up appointment date. If this is not feasible or you have forgotten, please call the office the day after surgery to obtain an appointment. 8. If you develop a fever greater than 101 degrees, unexpected redness or swelling in your lower extremity, please call the office immediately.

## 2023-10-25 NOTE — IP AVS SNAPSHOT
Current Discharge Medication List  
  
START taking these medications Dose & Instructions Dispensing Information Comments Morning Noon Evening Bedtime  
 ondansetron 4 mg disintegrating tablet Commonly known as:  ZOFRAN ODT Your last dose was: Your next dose is:    
   
   
 Dose:  4 mg Take 1 Tab by mouth every eight (8) hours as needed for Nausea. Quantity:  30 Tab Refills:  0  
     
   
   
   
  
 oxyCODONE IR 5 mg immediate release tablet Commonly known as:  Susi Abraham Your last dose was: Your next dose is: One to two tablets by mouth every 3 hours as needed for pain Quantity:  90 Tab Refills:  0 CONTINUE these medications which have NOT CHANGED Dose & Instructions Dispensing Information Comments Morning Noon Evening Bedtime PriLOSEC 20 mg capsule Generic drug:  omeprazole Your last dose was: Your next dose is:    
   
   
 Dose:  20 mg Take 20 mg by mouth daily. Refills:  0 PROBIOTIC 4X 10-15 mg Tbec Generic drug:  B.infantis-B.ani-B.long-B.bifi Your last dose was: Your next dose is:    
   
   
 Dose:  1 Tab Take 1 Tab by mouth daily. Refills:  0 Where to Get Your Medications Information on where to get these meds will be given to you by the nurse or doctor. ! Ask your nurse or doctor about these medications  
  ondansetron 4 mg disintegrating tablet  
 oxyCODONE IR 5 mg immediate release tablet Topical Metronidazole Pregnancy And Lactation Text: This medication is Pregnancy Category B and considered safe during pregnancy.  It is also considered safe to use while breastfeeding.

## (undated) DEVICE — SUTURE VCRL SZ 0 L27IN ABSRB UD L26MM CT-2 1/2 CIR J270H

## (undated) DEVICE — SKIN MARKER,REGULAR TIP WITH RULER AND LABELS: Brand: DEVON

## (undated) DEVICE — FRAZIER SUCTION INSTRUMENT 12 FR W/CONTROL VENT & OBTURATOR: Brand: FRAZIER

## (undated) DEVICE — LIGHT HANDLE: Brand: DEVON

## (undated) DEVICE — INFECTION CONTROL KIT SYS

## (undated) DEVICE — PADDING CAST W3INXL4YD NONSTERILE COT COHESIVE HND TEARABLE

## (undated) DEVICE — (D)PREP SKN CHLRAPRP APPL 26ML -- CONVERT TO ITEM 371833

## (undated) DEVICE — SUTURE VCRL SZ 3-0 L18IN ABSRB UD PS-2 L19MM 1/2 CIR J497G

## (undated) DEVICE — PADDING CST 4IN STERILE --

## (undated) DEVICE — STERILE POLYISOPRENE POWDER-FREE SURGICAL GLOVES: Brand: PROTEXIS

## (undated) DEVICE — STERILE POLYISOPRENE POWDER-FREE SURGICAL GLOVES WITH EMOLLIENT COATING: Brand: PROTEXIS

## (undated) DEVICE — SYR 1ML TB 1/100ML GRAD --

## (undated) DEVICE — Device

## (undated) DEVICE — SUTURE VCRL SZ 6-0 L18IN ABSRB UD L11MM P-1 3/8 CIR PRIM J489G

## (undated) DEVICE — STERILE HOOK LOCK ELASTIC BANDAGE 6IN X 10 YARD: Brand: HOOK LOCK™

## (undated) DEVICE — TOWEL SURG W17XL27IN STD BLU COT NONFENESTRATED PREWASHED

## (undated) DEVICE — DRILL, WL 70MM, AO-SHAFT: Brand: VARIAX

## (undated) DEVICE — ABDOMINAL PAD: Brand: DERMACEA

## (undated) DEVICE — DRAPE C ARM W54XL84IN MINI FOR OEC 6800

## (undated) DEVICE — NON-ADHERENT STRIPS,OIL EMULSION: Brand: CURITY

## (undated) DEVICE — SUTURE VCRL SZ 2-0 L36IN ABSRB UD L40MM CT 1/2 CIR J957H

## (undated) DEVICE — PADDING CAST W6INXL4YD COT COHESIVE HND TEARABLE SPEC 100

## (undated) DEVICE — Z INVALID SUPPLIER PI BUR SURG HD L4MM DIA4MM RND FLUT REPL CARB LINVATEC

## (undated) DEVICE — BONE SCREW, T10
Type: IMPLANTABLE DEVICE | Site: ANKLE | Status: NON-FUNCTIONAL
Brand: VARIAX
Removed: 2017-05-25

## (undated) DEVICE — BIT DRL L145MM DIA3.2MM QUIK CPL W/O STP REUSE

## (undated) DEVICE — SYR 3ML LL TIP 1/10ML GRAD --

## (undated) DEVICE — (D)SYR 10ML 1/5ML GRAD NSAF -- PKGING CHANGE USE ITEM 338027

## (undated) DEVICE — THREADED GUIDE WIRE

## (undated) DEVICE — REM POLYHESIVE ADULT PATIENT RETURN ELECTRODE: Brand: VALLEYLAB

## (undated) DEVICE — SOLUTION IV 1000ML 0.9% SOD CHL

## (undated) DEVICE — ZIMMER® STERILE DISPOSABLE TOURNIQUET CUFF WITH PROTECTIVE SLEEVE AND PLC, DUAL PORT, SINGLE BLADDER, 24 IN. (61 CM)

## (undated) DEVICE — SUTURE VCRL SZ 3-0 L27IN ABSRB UD L26MM SH 1/2 CIR J416H

## (undated) DEVICE — SPEEDGUIDE TM DRILL, AO: Brand: VARIAX

## (undated) DEVICE — INTENDED FOR TISSUE SEPARATION, AND OTHER PROCEDURES THAT REQUIRE A SHARP SURGICAL BLADE TO PUNCTURE OR CUT.: Brand: BARD-PARKER ® CARBON RIB-BACK BLADES

## (undated) DEVICE — SUT ETHLN 3-0 18IN PS2 BLK --

## (undated) DEVICE — BIT DRL L145MM DIA4.5MM QUIK CPL W/O STP REUSE